# Patient Record
Sex: MALE | URBAN - METROPOLITAN AREA
[De-identification: names, ages, dates, MRNs, and addresses within clinical notes are randomized per-mention and may not be internally consistent; named-entity substitution may affect disease eponyms.]

---

## 2017-04-27 ENCOUNTER — TELEPHONE (OUTPATIENT)
Dept: TRANSPLANT | Facility: CLINIC | Age: 57
End: 2017-04-27

## 2017-04-27 NOTE — LETTER
2017    Terrie Mark  [address]  [address]  [place]  [facility] [address]  Phone: [phone]  Fax: [phone]       Dear Dr. Terrie Mark,     Patient:  Chaz De La Fuente  MRN:  [id number]  :  1960    Thank you for referring Chaz De La Fuente to our lung transplant program.  Upon reviewing the medical records provided to our office, we find that Chaz De La Fuente is not a potential candidate for lung transplantation at [facility] as his insurance requires him to go to the [facility] in [place], [place].    Chaz De La Fuente should be referred by your office for lung transplant evaluation at [facility].    Once again, we appreciate your referral to our center.  We look forward to working with you in the future.  If you have any questions or concerns regarding this decision, then please do not hesitate to contact me at [phone].    Sincerely,         John Benito MD   Director, Lung Transplantation   Pulmonary & Critical Care Medicine    [facility]  [address] [place] [id number]  [phone]

## 2017-04-28 NOTE — TELEPHONE ENCOUNTER
Received referral from Dr. Terrie Mark.  Pt will need to lose 85 pounds to be considered for work up.  Dr. Benito is willing to consult, no tests needed.  LM for patient to find out if he would like to be seen for consult given the weight loss that will need to occur.

## 2017-05-03 NOTE — TELEPHONE ENCOUNTER
Spoke with patient's spouse, Iris.  Notified her that we received a referral from Dr. Mark to see the patient.  Explained that we will consult him, however, he will need to work on losing weight to a goal of 85 pounds.  Explained that our Dietician may be able to see him when he comes for consult to discuss weight loss.  She verbalized understanding and stated they would like an appointment.

## 2017-05-08 NOTE — TELEPHONE ENCOUNTER
Notified Carlton that their insurance requires that transplant patients be transplanted at [facility].  The patient can be seen for consult here, but would have to be evaluated at [facility] and turned down in order to be transplanted here.  I explained that their weight requirements will be similar to ours and that we could consult him and if he loses the weight, he could be referred to [facility] by Dr. Mark.  Iris verbalized understanding and stated that they would like to be consulted here first.  LM for Gabriella and asked if a Dietician visit could be covered as well.    Gabriella states that the insurance has denied consult here and he will have to be consulted at [facility] only.  Gabriella left messages for the patient and his spouse.

## 2017-05-26 ENCOUNTER — TELEPHONE (OUTPATIENT)
Dept: TRANSPLANT | Facility: CLINIC | Age: 57
End: 2017-05-26

## 2017-05-26 NOTE — TELEPHONE ENCOUNTER
Pt is unable to be consulted at [facility] due to insurance.  He must be consulted at [facility].  Pt was seen in clinic by Dr. Mark recently and would like records forwarded to [facility].  Nina, Dr. Mark's nurse, asked that I forward the records that were mailed to us to [facility].  Records mailed out today.